# Patient Record
(demographics unavailable — no encounter records)

---

## 2024-12-30 NOTE — PHYSICAL EXAM
[Alert] : alert [Normal Voice/Communication] : normal voice/communication [Healthy Appearing] : healthy appearing [No Acute Distress] : no acute distress [Sclera] : the sclera and conjunctiva were normal [Hearing Threshold Finger Rub Not [place]] : hearing was normal [Normal Lips/Gums] : the lips and gums were normal [Oropharynx] : the oropharynx was normal [Normal Appearance] : the appearance of the neck was normal [No Neck Mass] : no neck mass was observed [No Respiratory Distress] : no respiratory distress [No Acc Muscle Use] : no accessory muscle use [Respiration, Rhythm And Depth] : normal respiratory rhythm and effort [Auscultation Breath Sounds / Voice Sounds] : lungs were clear to auscultation bilaterally [Heart Rate And Rhythm] : heart rate was normal and rhythm regular [Normal S1, S2] : normal S1 and S2 [Murmurs] : no murmurs [None] : no edema [Bowel Sounds] : normal bowel sounds [Abdomen Tenderness] : non-tender [No Masses] : no abdominal mass palpated [Abdomen Soft] : soft [Cervical Lymph Nodes Enlarged Posterior Bilaterally] : no posterior cervical lymphadenopathy [Supraclavicular Lymph Nodes Enlarged Bilaterally] : no supraclavicular lymphadenopathy [Cervical Lymph Nodes Enlarged Anterior Bilaterally] : no anterior cervical lymphadenopathy [No Spinal Tenderness] : no spinal tenderness [Abnormal Walk] : normal gait [No Clubbing, Cyanosis] : no clubbing or cyanosis of the fingernails [Normal Color / Pigmentation] : normal skin color and pigmentation [] : no rash [No Focal Deficits] : no focal deficits [Oriented To Time, Place, And Person] : oriented to person, place, and time

## 2024-12-30 NOTE — HISTORY OF PRESENT ILLNESS
[FreeTextEntry1] : Dilma Medel 39-year-old [place] female was seen in consultation as a post ER visit, she was accompanied by her .  She is known to have gallstones at least for the past few years She was seen at emergency room recently for abdominal pain, US abdomen showed multiple gallstones without any cholecystitis or obstruction.  She denied any fever chills.  Jaundice.  Nausea or vomiting She reported upper abdominal pain frequently without any fever, nausea or vomiting or diarrhea,usually gets better with Tylenol. She reports abdominal bloating after meals Transaminases were below 20 during that visit, however in the past she had elevated alkaline phosphatase and ALT in the range of 120-150

## 2024-12-30 NOTE — ASSESSMENT
[FreeTextEntry1] : Romain is a 39-year-old Stateless woman, mother of 3 children was recently seen at an emergency room for abdominal pain most likely caused by gallstones.  She never had any upper endoscopy, or developed any obstructive signs from gallstones. Explained regarding the consequences and complications of gallstones and advised to go to the emergency room if she develops any of those signs Recommended upper endoscopy with EUS to rule out peptic ulcer disease, H. pylori gastritis and to reevaluate gallstones and CBD

## 2025-02-28 NOTE — ASSESSMENT
[FreeTextEntry1] : I discussed the results with her and advised to follow-up [facility]BT after 6 weeks to confirm eradication of H. pylori  Role of cholecystectomy for gallstones complications explained Advised to avoid fatty foods and follow-up as needed

## 2025-02-28 NOTE — HISTORY OF PRESENT ILLNESS
[FreeTextEntry1] : Gracie is a 39-year-old [place] woman came for follow-up, she was accompanied by her  and child. EGD US showed H. pylori gastritis and gallstones/sludge,normal pancreas and CBD She completed amoxicillin and clarithromycin+PPI a week ago Currently she is asymptomatic

## 2025-04-25 NOTE — PHYSICAL EXAM
[Alert] : alert [Normal Voice/Communication] : normal voice/communication [Healthy Appearing] : healthy appearing [No Acute Distress] : no acute distress [Sclera] : the sclera and conjunctiva were normal [Hearing Threshold Finger Rub Not [place]] : hearing was normal [Normal Lips/Gums] : the lips and gums were normal [Oropharynx] : the oropharynx was normal [Normal Appearance] : the appearance of the neck was normal [No Neck Mass] : no neck mass was observed [No Respiratory Distress] : no respiratory distress [No Acc Muscle Use] : no accessory muscle use [Respiration, Rhythm And Depth] : normal respiratory rhythm and effort [Auscultation Breath Sounds / Voice Sounds] : lungs were clear to auscultation bilaterally [Heart Rate And Rhythm] : heart rate was normal and rhythm regular [Normal S1, S2] : normal S1 and S2 [Murmurs] : no murmurs [None] : no edema [Bowel Sounds] : normal bowel sounds [Abdomen Tenderness] : non-tender [No Masses] : no abdominal mass palpated [Abdomen Soft] : soft [Cervical Lymph Nodes Enlarged Posterior Bilaterally] : no posterior cervical lymphadenopathy [Supraclavicular Lymph Nodes Enlarged Bilaterally] : no supraclavicular lymphadenopathy [Cervical Lymph Nodes Enlarged Anterior Bilaterally] : no anterior cervical lymphadenopathy [No Spinal Tenderness] : no spinal tenderness [Abnormal Walk] : normal gait [No Clubbing, Cyanosis] : no clubbing or cyanosis of the fingernails [Normal Color / Pigmentation] : normal skin color and pigmentation [] : no rash [No Focal Deficits] : no focal deficits [Oriented To Time, Place, And Person] : oriented to person, place, and time

## 2025-04-25 NOTE — PHYSICAL EXAM
[Alert] : alert [Normal Voice/Communication] : normal voice/communication [Healthy Appearing] : healthy appearing [No Acute Distress] : no acute distress [Sclera] : the sclera and conjunctiva were normal [Normal Lips/Gums] : the lips and gums were normal [Hearing Threshold Finger Rub Not [place]] : hearing was normal [Oropharynx] : the oropharynx was normal [Normal Appearance] : the appearance of the neck was normal [No Neck Mass] : no neck mass was observed [No Respiratory Distress] : no respiratory distress [No Acc Muscle Use] : no accessory muscle use [Respiration, Rhythm And Depth] : normal respiratory rhythm and effort [Auscultation Breath Sounds / Voice Sounds] : lungs were clear to auscultation bilaterally [Heart Rate And Rhythm] : heart rate was normal and rhythm regular [Normal S1, S2] : normal S1 and S2 [Murmurs] : no murmurs [None] : no edema [Bowel Sounds] : normal bowel sounds [Abdomen Tenderness] : non-tender [No Masses] : no abdominal mass palpated [Abdomen Soft] : soft [Cervical Lymph Nodes Enlarged Posterior Bilaterally] : no posterior cervical lymphadenopathy [Supraclavicular Lymph Nodes Enlarged Bilaterally] : no supraclavicular lymphadenopathy [Cervical Lymph Nodes Enlarged Anterior Bilaterally] : no anterior cervical lymphadenopathy [No Spinal Tenderness] : no spinal tenderness [Abnormal Walk] : normal gait [No Clubbing, Cyanosis] : no clubbing or cyanosis of the fingernails [Normal Color / Pigmentation] : normal skin color and pigmentation [] : no rash [No Focal Deficits] : no focal deficits [Oriented To Time, Place, And Person] : oriented to person, place, and time

## 2025-04-28 NOTE — ASSESSMENT
[FreeTextEntry1] : Dilma is a 39-year-old English female was seen in follow-up she was accompanied by her  at bedside. She has asymptomatic gallstones and sludge.  She also reported to have fatty liver disease although I have not seen the sonogram report.  She was treated for Helicobacter gastritis with Amox and clarithromycin.  I ordered urea breath test for H. pylori which will be done today

## 2025-04-28 NOTE — HISTORY OF PRESENT ILLNESS
[FreeTextEntry1] : Romain Medel is a 39-year-old female came for follow-up, she was accompanied by her , She completed the course of antibiotics amoxicillin and clarithromycin in first week of March, she had H. pylori positive gastritis and gallstones According to her she was also told to have fatty liver disease by her PCP She denied any nausea, vomiting, fever or chills episodes of jaundice in the past. Her appetite and bowel function is normal.  She is asymptomatic from gallstones

## 2025-04-28 NOTE — ASSESSMENT
[FreeTextEntry1] : Dilma is a 39-year-old [place] female was seen in follow-up she was accompanied by her  at bedside. She has asymptomatic gallstones and sludge.  She also reported to have fatty liver disease although I have not seen the sonogram report.  She was treated for Helicobacter gastritis with Amox and clarithromycin.  I ordered urea breath test for H. pylori which will be done today
